# Patient Record
Sex: FEMALE
[De-identification: names, ages, dates, MRNs, and addresses within clinical notes are randomized per-mention and may not be internally consistent; named-entity substitution may affect disease eponyms.]

---

## 2019-03-27 ENCOUNTER — HOSPITAL ENCOUNTER (EMERGENCY)
Dept: HOSPITAL 97 - ER | Age: 29
Discharge: HOME | End: 2019-03-27
Payer: SELF-PAY

## 2019-03-27 VITALS — OXYGEN SATURATION: 99 % | DIASTOLIC BLOOD PRESSURE: 78 MMHG | SYSTOLIC BLOOD PRESSURE: 105 MMHG

## 2019-03-27 VITALS — TEMPERATURE: 98 F

## 2019-03-27 DIAGNOSIS — Z3A.24: ICD-10-CM

## 2019-03-27 DIAGNOSIS — J20.9: Primary | ICD-10-CM

## 2019-03-27 PROCEDURE — 96372 THER/PROPH/DIAG INJ SC/IM: CPT

## 2019-03-27 PROCEDURE — 99284 EMERGENCY DEPT VISIT MOD MDM: CPT

## 2019-03-27 PROCEDURE — 87804 INFLUENZA ASSAY W/OPTIC: CPT

## 2019-03-27 NOTE — EDPHYS
Physician Documentation                                                                           

 [facility]                                                                 

Name: Luanne Montez                                                                                  

Age: 28 yrs                                                                                       

Sex: Female                                                                                       

: 1990                                                                                   

MRN: [id number]                                                                                   

Arrival Date: 2019                                                                          

Time: 16:12                                                                                       

Account#: [id number]                                                                            

Bed 20                                                                                            

Private MD:                                                                                       

ED Physician Zak Lizama                                                                      

HPI:                                                                                              

                                                                                             

16:19 This 28 yrs old  Female presents to ER via Ambulatory with complaints of Sinus  jmm 

      Congestion.                                                                                 

16:19 The patient or guardian reports cough. Onset: The symptoms/episode began/occurred       jmm 

      gradually. Modifying factors: The symptoms are alleviated by nothing, the symptoms are      

      aggravated by nothing. This is a 28 year old female with no chronic medical conditions      

      currently 24 weeks pregnant that presents to the ED with complaints of sinus                

      congestion, cough beginning last night. Patient complaints of chills. Patient denies        

      vaginal bleeding or abdominal/pelvic pain.                                                  

                                                                                                  

OB/GYN:                                                                                           

16:21 LMP 10/8/2018                                                                           tw2 

                                                                                                  

Historical:                                                                                       

- Allergies:                                                                                      

16:21 No Known Allergies;                                                                     tw2 

- Home Meds:                                                                                      

16:21 None [Active];                                                                          tw2 

- PMHx:                                                                                           

16:21 None;                                                                                   tw2 

- PSHx:                                                                                           

16:21 ;                                                                              tw2 

                                                                                                  

- Immunization history:: Adult Immunizations.                                                     

- Social history:: Smoking status: Patient/guardian denies using tobacco.                         

- Ebola Screening: : Patient denies travel to an Ebola-affected area in the 21 days               

  before illness onset.                                                                           

- : The history from the nurse's notes was reviewed.                                              

                                                                                                  

                                                                                                  

ROS:                                                                                              

16:21 Constitutional: Positive for chills.                                                    jmm 

16:21 Cardiovascular: Negative for chest pain, palpitations, and edema.                       jmm 

16:21 Respiratory: Positive for cough.                                                            

16:21 Abdomen/GI: Positive for Negative for abdominal pain, nausea and vomiting.                  

16:21 : Negative for vaginal bleeding.                                                          

16:21 All other systems are negative.                                                             

                                                                                                  

Exam:                                                                                             

16:21 Constitutional:  This is a well developed, well nourished patient who is awake, alert,  jmm 

      and in no acute distress. Head/Face:  atraumatic. Eyes:  EOMI, no conjunctival erythema     

      appreciated                                                                                 

16:21 Chest/axilla:  Normal chest wall appearance and motion.                                     

16:21 Abdomen/GI:  Non distended, soft Back:  Normal ROM Skin:  General appearance color          

      normal MS/ Extremity:  Moves all extremities, no obvious deformities appreciated, no        

      edema noted to the lower extremities  Neuro:  Awake and alert, normal gait Psych:           

      Behavior is normal, Mood is normal, Patient is cooperative and pleasant                     

16:21 ENT: TM's: are normal, Posterior pharynx: erythema, that is mild.                           

16:21 Cardiovascular: Rate: normal, Rhythm: regular.                                              

16:21 Respiratory: the patient does not display signs of respiratory distress,  Respirations:     

      normal, Breath sounds: wheezing: that is mild, is scattered.                                

                                                                                                  

Vital Signs:                                                                                      

16:21  / 55; Pulse 122; Resp 20; Temp 98(TE); Pulse Ox 99% on R/A; Weight 108.86 kg     tw2 

      (R); Height 5 ft. 4 in. (162.56 cm); Pain 0/10;                                             

19:45 BP 95 / 65; Pulse 108; Resp 16; Pulse Ox 100% on R/A;                                   jb4 

20:43  / 78; Pulse 99; Resp 16; Pulse Ox 99% on R/A;                                    jb4 

16:21 Body Mass Index 41.20 (108.86 kg, 162.56 cm)                                            tw2 

                                                                                                  

MDM:                                                                                              

18:07 Patient medically screened.                                                             [facility] 

20:38 Data reviewed: vital signs, nurses notes. Counseling: I had a detailed discussion with  [facility] 

      the patient and/or guardian regarding: the historical points, exam findings, and any        

      diagnostic results supporting the discharge/admit diagnosis, the need for outpatient        

      follow up, to return to the emergency department if symptoms worsen or persist or if        

      there are any questions or concerns that arise at home.                                     

20:45 ED course: Patient states feeling much better after nebulizer treatment. The patient    [facility] 

      refused chest xray. Symptoms appear most likely related to a viral bronchitis. Patient      

      adminstered dexamethasone and advised to follow up with obgyn in 1 to 2 days for            

      reevaluation. Patient is given strict return precautions. Patient understood and agrees     

      with the plan of care. .                                                                    

                                                                                                  

                                                                                             

18:00 Order name: Flu; Complete Time: 18:50                                                   [facility] 

                                                                                             

18:08 Order name: Fetal Heart Tones; Complete Time: 18:41                                     [facility] 

                                                                                                  

Administered Medications:                                                                         

18:42 Drug: Xopenex (3) 1.25 mg Route: Inhalation;                                            bp  

20:40 Drug: Decadron 10 mg Route: IM; Site: left gluteus;                                     [facility] 

20:50 Follow up: Response: No adverse reaction                                                jb4 

                                                                                                  

                                                                                                  

Disposition:                                                                                      

                                                                                             

07:27 Co-signature as Attending Physician, Zak Lizama MD I agree with the assessment and  wa  

      plan of care.                                                                               

                                                                                                  

Disposition:                                                                                      

19 20:39 Discharged to Home. Impression: Acute bronchitis.                                  

- Condition is Stable.                                                                            

- Discharge Instructions: Acute Bronchitis, Adult.                                                

- Prescriptions for Albuterol Sulfate 90 mcg/actuation - inhale 1-2 puff by INHALATION            

  route every 4-6 hours; 1 Inhaler.                                                               

- Medication Reconciliation Form, Thank You Letter, Antibiotic Education, Prescription            

  Opioid Use form.                                                                                

- Follow up: Private Physician; When: 2 - 3 days; Reason: Recheck today's complaints,             

  Continuance of care, Re-evaluation by your physician.                                           

                                                                                                  

                                                                                                  

                                                                                                  

Signatures:                                                                                       

Dispatcher [employer]                           [facility]                                                 

Krishna Leary PA PA jmm Wise, Tara, RN                          RN   tw2                                                  

Ken Powers RN                       RN   jb4                                                  

Zak Lizama MD MD wa Peltier, Brian RN                      RN   bp                                                   

                                                                                                  

Corrections: (The following items were deleted from the chart)                                    

                                                                                             

20:44 18:08 Chest Single View+RAD.RAD.BRZ ordered. [facility]

20:53 20:39 2019 20:39 Discharged to Home. Impression: Acute bronchitis. Condition is   jb4 

      Stable. Forms are Medication Reconciliation Form, Thank You Letter, Antibiotic              

      Education, Prescription Opioid Use. Follow up: Private Physician; When: 2 - 3 days;         

      Reason: Recheck today's complaints, Continuance of care, Re-evaluation by your              

      physician. [facility]                                                                              

22:26 16:19 The history from the nurse's notes was reviewed. [facility] 

                                                                                             

02:41  16:19 This is a 28 year old female with no chronic medical conditions currently   [facility] 

      24 weeks pregnant that presents to the ED with complaints of sinus congestion, cough        

      beginning last night. Patient complaints of chills. . [facility]                                   

                                                                                                  

**************************************************************************************************

## 2019-03-27 NOTE — ER
Nurse's Notes                                                                                     

 [facility]                                                                 

Name: Luanne Montez                                                                                  

Age: 28 yrs                                                                                       

Sex: Female                                                                                       

: 1990                                                                                   

MRN: [id number]                                                                                   

Arrival Date: 2019                                                                          

Time: 16:12                                                                                       

Account#: [id number]                                                                            

Bed 20                                                                                            

Private MD:                                                                                       

Diagnosis: Acute bronchitis                                                                       

                                                                                                  

Presentation:                                                                                     

                                                                                             

16:19 Presenting complaint: Patient states: i have really bad sinus congestion, i feel short  tw2 

      of breath, i am 24 weeks pregnant, it coughs thru to my back just this morning it           

      started. Transition of care: patient was not received from another setting of care.         

      Onset of symptoms was 2019. Risk Assessment: Do you want to hurt yourself or      

      someone else?. Risk Assessment: Do you want to hurt yourself or someone else? Patient       

      reports no desire to harm self or others. Initial Sepsis Screen: Does the patient meet      

      any 2 criteria? RR > 20 per min. HR > 90 bpm. Does the patient have a suspected source      

      of infection? Yes: Productive cough/pneumonia. Initial Sepsis Screen:. Care prior to        

      arrival: None.                                                                              

16:19 Method Of Arrival: Ambulatory                                                           tw2 

16:19 Acuity: BENJIE 3                                                                           tw2 

16:21 Note pt reports "the baby has been moving fine and all that".                           tw2 

                                                                                                  

Triage Assessment:                                                                                

16:21 General: Appears in no apparent distress. Behavior is calm, cooperative, appropriate    tw2 

      for age. Pain: Denies pain.                                                                 

                                                                                                  

OB/GYN:                                                                                           

16:21 LMP 10/8/2018                                                                           tw2 

                                                                                                  

Historical:                                                                                       

- Allergies:                                                                                      

16:21 No Known Allergies;                                                                     tw2 

- Home Meds:                                                                                      

16:21 None [Active];                                                                          tw2 

- PMHx:                                                                                           

16:21 None;                                                                                   tw2 

- PSHx:                                                                                           

16:21 ;                                                                              tw2 

                                                                                                  

- Immunization history:: Adult Immunizations.                                                     

- Social history:: Smoking status: Patient/guardian denies using tobacco.                         

- Ebola Screening: : Patient denies travel to an Ebola-affected area in the 21 days               

  before illness onset.                                                                           

- : The history from the nurse's notes was reviewed.                                              

                                                                                                  

                                                                                                  

Screenin:00 Abuse screen: Denies threats or abuse. Denies injuries from another. Nutritional        bp  

      screening: No deficits noted. Tuberculosis screening: No symptoms or risk factors           

      identified. Fall Risk None identified.                                                      

                                                                                                  

Assessment:                                                                                       

16:55 General: Appears in no apparent distress. comfortable, Behavior is calm, cooperative,   bp  

      appropriate for age. Pain: Complains of pain in SINUS. Neuro: Level of Consciousness is     

      awake, alert, obeys commands, Oriented to person, place, time, situation, Appropriate       

      for age. Cardiovascular: No deficits noted. Respiratory: Airway is patent Respiratory       

      effort is even, unlabored, Respiratory pattern is regular, symmetrical. GI: No signs        

      and/or symptoms were reported involving the gastrointestinal system. : No signs           

      and/or symptoms were reported regarding the genitourinary system. EENT: Reports SINUS       

      PAIN. Derm: No deficits noted. Musculoskeletal: Circulation, motion, and sensation          

      intact. Range of motion: intact in all extremities.                                         

19:00 Reassessment: Patient appears in no apparent distress at this time. Patient and/or      jb4 

      family updated on plan of care and expected duration. Pain level reassessed. Patient is     

      alert, oriented x 3, equal unlabored respirations, skin warm/dry/pink.                      

20:00 Reassessment: Patient appears in no apparent distress at this time. Patient and/or      jb4 

      family updated on plan of care and expected duration. Pain level reassessed. Patient is     

      alert, oriented x 3, equal unlabored respirations, skin warm/dry/pink.                      

20:50 Reassessment: Patient appears in no apparent distress at this time. Patient and/or      jb4 

      family updated on plan of care and expected duration. Pain level reassessed. Patient is     

      alert, oriented x 3, equal unlabored respirations, skin warm/dry/pink.                      

                                                                                                  

Vital Signs:                                                                                      

16:21  / 55; Pulse 122; Resp 20; Temp 98(TE); Pulse Ox 99% on R/A; Weight 108.86 kg     tw2 

      (R); Height 5 ft. 4 in. (162.56 cm); Pain 0/10;                                             

19:45 BP 95 / 65; Pulse 108; Resp 16; Pulse Ox 100% on R/A;                                   jb4 

20:43  / 78; Pulse 99; Resp 16; Pulse Ox 99% on R/A;                                    jb4 

16:21 Body Mass Index 41.20 (108.86 kg, 162.56 cm)                                            tw2 

                                                                                                  

Vitals:                                                                                           

18:42 Fetal Heart Tones 140.                                                                  bp  

                                                                                                  

ED Course:                                                                                        

16:12 Patient arrived in ED.                                                                  tw3 

16:20 Triage completed.                                                                       tw2 

16:21 Arm band placed on.                                                                     tw2 

16:58 Reji Villatoro, RN is Primary Nurse.                                                    bp  

17:00 Patient has correct armband on for positive identification. Bed in low position. Call   bp  

      light in reach. Side rails up X2.                                                           

17:18 Krishna Leary PA is PHCP.                                                              [facility] 

17:18 Zak Lizama MD is Attending Physician.                                             [facility] 

20:43 No provider procedures requiring assistance completed. Patient did not have IV access   jb4 

      during this emergency room visit.                                                           

                                                                                                  

Administered Medications:                                                                         

18:42 Drug: Xopenex (3) 1.25 mg Route: Inhalation;                                            bp  

20:40 Drug: Decadron 10 mg Route: IM; Site: left gluteus;                                     jb4 

20:50 Follow up: Response: No adverse reaction                                                jb4 

                                                                                                  

                                                                                                  

Outcome:                                                                                          

20:39 Discharge ordered by MD.                                                                [facility] 

20:43 Discharged to home ambulatory.                                                          jb4 

20:43 Condition: stable                                                                           

20:43 Discharge instructions given to patient, Instructed on discharge instructions, follow       

      up and referral plans. medication usage, Demonstrated understanding of instructions,        

      follow-up care, medications, Prescriptions given X 1.                                       

20:53 Patient left the ED.                                                                    jb4 

                                                                                                  

Signatures:                                                                                       

Krishna Leary PA                       PA   [facility]                                                  

Meera Ch, RN                          RN   tw2                                                  

Ken Powers, RN                       RN   jb4                                                  

Anupama Olson                                    tw3                                                  

Reji Villatoro, RN                      RN   bp                                                   

                                                                                                  

Corrections: (The following items were deleted from the chart)                                    

22:26 16:19 The history from the nurse's notes was reviewed. enio [email or web address] 

                                                                                                  

**************************************************************************************************